# Patient Record
Sex: MALE | Race: WHITE | NOT HISPANIC OR LATINO | Employment: UNEMPLOYED | URBAN - METROPOLITAN AREA
[De-identification: names, ages, dates, MRNs, and addresses within clinical notes are randomized per-mention and may not be internally consistent; named-entity substitution may affect disease eponyms.]

---

## 2021-12-24 ENCOUNTER — HOSPITAL ENCOUNTER (EMERGENCY)
Facility: HOSPITAL | Age: 36
Discharge: LEFT BEFORE DISCHARGE | End: 2021-12-24
Attending: EMERGENCY MEDICINE
Payer: MEDICAID

## 2021-12-24 VITALS
TEMPERATURE: 98 F | RESPIRATION RATE: 20 BRPM | HEART RATE: 83 BPM | OXYGEN SATURATION: 99 % | DIASTOLIC BLOOD PRESSURE: 91 MMHG | SYSTOLIC BLOOD PRESSURE: 142 MMHG

## 2021-12-24 DIAGNOSIS — S63.104A DISLOCATION OF RIGHT THUMB, INITIAL ENCOUNTER: Primary | ICD-10-CM

## 2021-12-24 DIAGNOSIS — L03.011 CELLULITIS OF THUMB, RIGHT: ICD-10-CM

## 2021-12-24 LAB
CRP SERPL-MCNC: 9 MG/L (ref 0–8.2)
ERYTHROCYTE [SEDIMENTATION RATE] IN BLOOD BY WESTERGREN METHOD: 10 MM/HR (ref 0–23)

## 2021-12-24 PROCEDURE — 99284 PR EMERGENCY DEPT VISIT,LEVEL IV: ICD-10-PCS | Mod: ,,, | Performed by: EMERGENCY MEDICINE

## 2021-12-24 PROCEDURE — 99283 EMERGENCY DEPT VISIT LOW MDM: CPT

## 2021-12-24 PROCEDURE — 99284 EMERGENCY DEPT VISIT MOD MDM: CPT | Mod: ,,, | Performed by: EMERGENCY MEDICINE

## 2021-12-24 PROCEDURE — 86140 C-REACTIVE PROTEIN: CPT

## 2021-12-24 PROCEDURE — 25000003 PHARM REV CODE 250

## 2021-12-24 PROCEDURE — 85652 RBC SED RATE AUTOMATED: CPT

## 2021-12-24 RX ORDER — LIDOCAINE HYDROCHLORIDE 10 MG/ML
10 INJECTION INFILTRATION; PERINEURAL ONCE
Status: COMPLETED | OUTPATIENT
Start: 2021-12-24 | End: 2021-12-24

## 2021-12-24 RX ORDER — SULFAMETHOXAZOLE AND TRIMETHOPRIM 800; 160 MG/1; MG/1
1 TABLET ORAL 2 TIMES DAILY
Qty: 14 TABLET | Refills: 0 | Status: SHIPPED | OUTPATIENT
Start: 2021-12-24 | End: 2021-12-31

## 2021-12-24 RX ORDER — AMOXICILLIN AND CLAVULANATE POTASSIUM 875; 125 MG/1; MG/1
1 TABLET, FILM COATED ORAL 2 TIMES DAILY
Qty: 14 TABLET | Refills: 0 | Status: SHIPPED | OUTPATIENT
Start: 2021-12-24 | End: 2021-12-24 | Stop reason: ALTCHOICE

## 2021-12-24 RX ORDER — SULFAMETHOXAZOLE AND TRIMETHOPRIM 800; 160 MG/1; MG/1
1 TABLET ORAL 2 TIMES DAILY
Qty: 14 TABLET | Refills: 0 | Status: SHIPPED | OUTPATIENT
Start: 2021-12-24 | End: 2021-12-24 | Stop reason: SDUPTHER

## 2021-12-24 RX ADMIN — LIDOCAINE HYDROCHLORIDE 10 ML: 10 INJECTION, SOLUTION INFILTRATION; PERINEURAL at 04:12

## 2021-12-24 NOTE — ED NOTES
MD at bedside explaining risk of leaving AMA. Pt verbalized understanding and AMA form signed by pt, MD, and 2 witnesses. Placed in scan bin

## 2021-12-24 NOTE — DISCHARGE INSTRUCTIONS
Please return to the emergency department if you begin to experience symptoms including fevers, chills or pus-like drainage from your wound.

## 2021-12-24 NOTE — ED PROVIDER NOTES
Encounter Date: 12/24/2021       History     Chief Complaint   Patient presents with    Hand Pain     Pt is transfer from [facility] for right thumb fx     Patient is a 36-year-old male with a past medical history of psychosis and polysubstance abuse coming in as a transfer for orthopedic evaluation for a dislocated right thumb phalanx.  The patient has associated some whole redness and swelling that has been present for a couple of months following a snake bite.  At the time, he was given the antivenom.  He has had multiple rounds of antibiotics since.  The area is draining.  He denies associated fevers or chills.  Per the outside hospital's note, he was given vancomycin.  He further denies any concurrent episodes of trauma to the thumb.        Review of patient's allergies indicates:  No Known Allergies  History reviewed. No pertinent past medical history.  No past surgical history on file.  History reviewed. No pertinent family history.     Review of Systems   Constitutional: Negative for activity change, chills and fever.   HENT: Negative for congestion, ear pain and sore throat.    Respiratory: Negative for shortness of breath and stridor.    Cardiovascular: Negative for chest pain and palpitations.   Gastrointestinal: Negative for abdominal pain, nausea and vomiting.   Genitourinary: Negative for dysuria.   Musculoskeletal: Positive for arthralgias (R thumb). Negative for back pain.   Skin: Positive for wound (R thumb). Negative for rash.   Neurological: Negative for dizziness, syncope, weakness and headaches.   Hematological: Does not bruise/bleed easily.       Physical Exam     Initial Vitals [12/24/21 0217]   BP Pulse Resp Temp SpO2   (!) 140/86 79 18 98 °F (36.7 °C) 100 %      MAP       --         Physical Exam    Nursing note and vitals reviewed.  Constitutional: He appears well-developed and well-nourished. He is not diaphoretic. No distress.   Unkempt, but well appearing.  Speaking full sentences.  No  acute distress.   HENT:   Head: Normocephalic and atraumatic.   Right Ear: External ear normal.   Left Ear: External ear normal.   Neck: Neck supple.   Cardiovascular: Normal rate, regular rhythm, normal heart sounds and intact distal pulses.   Pulmonary/Chest: Breath sounds normal. No respiratory distress. He has no wheezes. He has no rhonchi. He has no rales.   Abdominal: Abdomen is soft. He exhibits no distension. There is no abdominal tenderness. There is no rebound and no guarding.   Musculoskeletal:      Cervical back: Neck supple.      Comments: There is diffuse erythema and swelling to the right thumb. He is able to flex at IP joint both passively and actively without any tenderness elicited.  There is a tract that is draining serosanguineous fluid.     Neurological: He is alert and oriented to person, place, and time. GCS score is 15. GCS eye subscore is 4. GCS verbal subscore is 5. GCS motor subscore is 6.   Skin: Skin is warm. Capillary refill takes less than 2 seconds. No rash noted.   Psychiatric: He has a normal mood and affect.         ED Course   Procedures  Labs Reviewed   C-REACTIVE PROTEIN - Abnormal; Notable for the following components:       Result Value    CRP 9.0 (*)     All other components within normal limits   SEDIMENTATION RATE          Imaging Results    None          Medications   LIDOcaine HCL 10 mg/ml (1%) injection 10 mL (10 mLs Other Given by Other 12/24/21 4194)     Medical Decision Making:   History:   I obtained history from: someone other than patient.  Old Medical Records: I decided to obtain old medical records.  Initial Assessment:   Emergent evaluation of dislocated right thumb phalanx with overlying erythema and diffuse swelling of digits.  He is afebrile and hemodynamically stable.  Differential Diagnosis:   Dislocation versus fracture right thumb, cellulitis, abscess, flexor tenosynovitis less likely  Clinical Tests:   Lab Tests: Ordered and Reviewed  Radiological Study:  Reviewed and Ordered  Medical Tests: Ordered and Reviewed  ED Management:  Discussed case with orthopedics.  They will attempt to reduce patient at bedside. Plan for discharge with PO ABX. Return precautions discussed at bedside and he expressed understanding.            Attending Attestation:   Physician Attestation Statement for Resident:  As the supervising MD   Physician Attestation Statement: I have personally seen and examined this patient.   I agree with the above history. -:   As the supervising MD I agree with the above PE.    As the supervising MD I agree with the above treatment, course, plan, and disposition.   -: Transfer from outside hospital for right hand/dominant hand thumb infection and dislocation.  He received vancomycin at outside hospital.      I have interviewed and examined the patient. The patient wishes to leave the emergency department against medical advice.    - I have determined that the patient has the capacity to understand the information relevant to their care.   - The patient also understands the consequences of the various options after we discussed relevant information.   - I feel that the patient is able to understand the relevant information and responds appropriately to questions.   - I have attempted to persuade the patient to stay to receive care.   - The patient understands by leaving there is a possibility of death, disability, or serious injury.  - Despite the above information, the patient had made the decision to leave against medical advice.   - I have attempted to persuade the patient to follow up with a physician as soon as possible.   - I have also notified the patient they may return at any time to the ED for further care.  I have reviewed and agree with the residents interpretation of the following: lab data and x-rays.  I have reviewed the following: records from a referring facility.                         Clinical Impression:   Final diagnoses:  [K49.020F]  Dislocation of right thumb, initial encounter (Primary)  [L03.011] Cellulitis of thumb, right                 Real Jauregui MD  Resident  12/24/21 0546       Abbie Santamaria MD  12/24/21 0547       Abbie Santamaria MD  12/24/21 0555

## 2021-12-24 NOTE — PLAN OF CARE
"HPI: Patient is a 36-year-old male with a past medical history of psychosis and polysubstance abuse coming in as a transfer for orthopedic evaluation for a dislocated right thumb phalanx. Pt reports that he is unable to recall when he dislocated the phalanx, but has redness and swelling of the right thumb since Mid-November. He reports he was previously bitten by a snake ("small water mocassin") and received antivenom. He has undergone some antibiotic treatment since. He has attempted to drain the wound on his own and presensted with a poke hole incision to the radial aspect of the right thumb. He was given Vancomycin before transfer. He is right hand dominant and reports methamphetamine use with last use three days ago.    Physical Exam:  Gen:  No acute distress, well-developed, dishelved  CV:  Peripherally well-perfused.   Respiratory:  Normal respiratory effort. No accessory muscle use.   Head/Neck:  Normocephalic.  Atraumatic.  Neuro: CN 2-12 grossly intact. No FND.  Abdomen: Soft, NTND  Psych: Erratic    MSK:  RUE:  - Erythema and edema to the thumb most pronounced at the dorsal aspect of the digit with a small poke-hole incision over the radial aspect of the digit  - Erythema and edema to right volar forearm at site of previous injection per patient report  - NonTTP throughout  - AROM and PROM of the shoulder, elbow, wrist, and hand intact without pain, decreased ROM of thumb flexion at time of exam  - Axillary/AIN/PIN/Radial/Median/Ulnar Nerves assessed in isolation without deficit  - SILT throughout  - Compartments soft  - Radial artery palpated   - Capillary Refill <3s                  Plan: Given the patient's history of chronic wound, IVDU, and physical exam, it was recommended that a bedside incision and drainage be performed under digit block with attempted reduction of the digit after. An extensive conversation was had with the patient regarding his current medical condition and the treatment options. " "The patient agreed to attempt I&D with digit block but did refused the block after attempted lidocaine injection of the volar surface. This was after tasting the betadine and stating that it "tastes sterile." Patient often combative and uncooperative during his visit in the ER with erratic behavior. The patient elected to leave against medical advice after the risks of doing so (death, further infection, wound complications, loss of function of the hand and extremity, worsening medical condition) were explained. We did ensure that patient at least received outpatient antibiotic treatment before discharging, and made sure the pt understood that the infection would not be appropriately treated without also decompressing the abscess. All paperwork was signed and completed before his departure from the emergency department.   "

## 2021-12-24 NOTE — ED TRIAGE NOTES
"Puneet Domingo, an 36 y.o. male presents to the ED c/o hand pain s/p snake bite on R thumb >60 days ago. Pt reports injury was getting better until about "15 days ago" when pain and swelling returned. Pt's thumb red and swollen with puncture draining red fluid. Pt reports piercing thumb 2 weeks ago to drain it      Chief Complaint   Patient presents with    Hand Pain     Pt is transfer from [facility] for right thumb fx     Review of patient's allergies indicates:  No Known Allergies  No past medical history on file.   LOC: The patient is awake, alert and aware of environment with an appropriate affect, the patient is oriented x 3 and speaking appropriately.   APPEARANCE: Patient appears comfortable and in no acute distress, patient is clean and well groomed.  SKIN: The skin is warm and dry, color consistent with ethnicity.   MUSCULOSKELETAL: Patient moving all extremities spontaneously, no swelling noted. Pt L thumb swollen, red, and painful  RESPIRATORY: Airway is open and patent, respirations are spontaneous, patient has a normal effort and rate, no accessory muscle use noted.  CARDIAC: Patient has a normal rate and regular rhythm, no edema noted, capillary refill < 3 seconds.   GASTRO: Soft and non tender to palpation, no distention noted.   : Pt denies any pain or frequency with urination.  NEURO: Pt opens eyes spontaneously, behavior appropriate to situation, follows commands.  "